# Patient Record
Sex: MALE | Race: WHITE | NOT HISPANIC OR LATINO | Employment: UNEMPLOYED | URBAN - METROPOLITAN AREA
[De-identification: names, ages, dates, MRNs, and addresses within clinical notes are randomized per-mention and may not be internally consistent; named-entity substitution may affect disease eponyms.]

---

## 2023-05-09 PROBLEM — F10.10 ETOH ABUSE: Status: ACTIVE | Noted: 2023-05-09

## 2023-05-09 PROBLEM — S82.871C OPEN DISPLACED PILON FRACTURE OF RIGHT TIBIA, TYPE IIIA, IIIB, OR IIIC: Status: ACTIVE | Noted: 2023-05-09

## 2023-05-09 PROBLEM — M15.9 PRIMARY OSTEOARTHRITIS INVOLVING MULTIPLE JOINTS: Chronic | Status: ACTIVE | Noted: 2023-05-09

## 2023-05-09 PROBLEM — S27.322A BILATERAL PULMONARY CONTUSION: Status: ACTIVE | Noted: 2023-05-09

## 2023-05-09 PROBLEM — F12.10 MILD TETRAHYDROCANNABINOL (THC) ABUSE: Status: ACTIVE | Noted: 2023-05-09

## 2023-05-09 PROBLEM — S50.811A ABRASION OF RIGHT FOREARM: Status: ACTIVE | Noted: 2023-05-09

## 2023-05-09 PROBLEM — K43.9 VENTRAL HERNIA WITHOUT OBSTRUCTION OR GANGRENE: Status: ACTIVE | Noted: 2023-05-09

## 2023-05-09 PROBLEM — S06.6X9A SUBARACHNOID HEMORRHAGE FOLLOWING INJURY, WITH LOSS OF CONSCIOUSNESS: Status: ACTIVE | Noted: 2023-05-09
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
## 2023-05-09 PROBLEM — J93.9 PNEUMOTHORAX ON LEFT: Status: ACTIVE | Noted: 2023-05-09

## 2023-05-09 PROBLEM — F10.921 ALCOHOL INTOXICATION WITH DELIRIUM: Status: ACTIVE | Noted: 2023-05-09

## 2023-05-09 PROBLEM — Z99.11 VENTILATOR DEPENDENCE: Status: ACTIVE | Noted: 2023-05-09

## 2023-05-09 PROBLEM — R57.9 SHOCK CIRCULATORY: Status: ACTIVE | Noted: 2023-05-09

## 2023-05-09 PROBLEM — S22.41XA CLOSED FRACTURE OF MULTIPLE RIBS OF RIGHT SIDE: Status: ACTIVE | Noted: 2023-05-09

## 2023-05-09 PROBLEM — F15.90 AMPHETAMINE USE: Status: ACTIVE | Noted: 2023-05-09

## 2023-05-09 PROBLEM — F12.10 MILD TETRAHYDROCANNABINOL (THC) ABUSE: Chronic | Status: ACTIVE | Noted: 2023-05-09

## 2023-05-09 PROBLEM — M47.815 SPONDYLOSIS OF THORACOLUMBAR REGION WITHOUT MYELOPATHY OR RADICULOPATHY: Status: ACTIVE | Noted: 2023-05-09

## 2023-05-09 PROBLEM — K86.89 PANCREATIC ATROPHY: Chronic | Status: ACTIVE | Noted: 2023-05-09

## 2023-05-09 PROBLEM — S51.811A LACERATION OF RIGHT FOREARM: Status: ACTIVE | Noted: 2023-05-09

## 2023-05-09 PROBLEM — M25.051 HEMARTHROSIS, RIGHT HIP: Status: ACTIVE | Noted: 2023-05-09

## 2023-05-09 PROBLEM — S41.112A LACERATION OF LEFT UPPER EXTREMITY: Status: ACTIVE | Noted: 2023-05-09

## 2023-05-09 PROBLEM — S00.81XA ABRASION OF FOREHEAD: Status: ACTIVE | Noted: 2023-05-09

## 2023-05-09 PROBLEM — S30.811A ABRASION OF FLANK: Status: ACTIVE | Noted: 2023-05-09

## 2023-05-09 PROBLEM — N28.1 RENAL CYST, RIGHT: Chronic | Status: ACTIVE | Noted: 2023-05-09

## 2023-05-09 PROBLEM — S91.011A: Status: ACTIVE | Noted: 2023-05-09

## 2023-05-09 PROBLEM — R57.9 SHOCK CIRCULATORY: Status: RESOLVED | Noted: 2023-05-09 | Resolved: 2023-05-09

## 2023-05-09 PROBLEM — S27.899A TRAUMATIC MEDIASTINAL HEMATOMA: Status: ACTIVE | Noted: 2023-05-09

## 2023-05-09 PROBLEM — S20.312A ABRASION OF LEFT CHEST WALL: Status: ACTIVE | Noted: 2023-05-09

## 2023-05-09 PROBLEM — K83.8 COMMON BILE DUCT DILATATION: Status: ACTIVE | Noted: 2023-05-09

## 2023-05-09 PROBLEM — F10.10 ETOH ABUSE: Chronic | Status: ACTIVE | Noted: 2023-05-09

## 2023-05-09 PROBLEM — E27.9 ADRENAL NODULE: Status: ACTIVE | Noted: 2023-05-09

## 2023-05-09 PROBLEM — E27.8 ADRENAL NODULE: Chronic | Status: ACTIVE | Noted: 2023-05-09

## 2023-05-09 PROBLEM — S20.311A ABRASION OF RIGHT CHEST WALL: Status: ACTIVE | Noted: 2023-05-09

## 2023-05-09 PROBLEM — J43.1 PANLOBULAR EMPHYSEMA: Status: ACTIVE | Noted: 2023-05-09

## 2023-05-09 PROBLEM — R18.8 INTRA-ABDOMINAL FLUID: Status: ACTIVE | Noted: 2023-05-09

## 2023-05-09 PROBLEM — J43.1 PANLOBULAR EMPHYSEMA: Chronic | Status: ACTIVE | Noted: 2023-05-09

## 2023-05-09 PROBLEM — R79.89 ELEVATED TROPONIN: Status: ACTIVE | Noted: 2023-05-09

## 2023-05-09 PROBLEM — N40.0 BPH (BENIGN PROSTATIC HYPERPLASIA): Status: ACTIVE | Noted: 2023-05-09

## 2023-05-09 PROBLEM — I70.0 ATHEROSCLEROSIS OF AORTA: Chronic | Status: ACTIVE | Noted: 2023-05-09

## 2023-05-09 PROBLEM — S22.21XA CLOSED FRACTURE OF MANUBRIUM: Status: ACTIVE | Noted: 2023-05-09

## 2023-05-09 PROBLEM — F10.921 ALCOHOL INTOXICATION WITH DELIRIUM: Status: RESOLVED | Noted: 2023-05-09 | Resolved: 2023-05-09

## 2023-05-09 PROBLEM — S51.011A LACERATION OF RIGHT ELBOW: Status: ACTIVE | Noted: 2023-05-09

## 2023-05-09 PROBLEM — S22.42XA CLOSED FRACTURE OF MULTIPLE RIBS OF LEFT SIDE: Status: ACTIVE | Noted: 2023-05-09

## 2023-05-09 PROBLEM — S26.91XA CARDIAC CONTUSION: Status: ACTIVE | Noted: 2023-05-09

## 2023-05-09 PROBLEM — R73.9 HYPERGLYCEMIA: Status: ACTIVE | Noted: 2023-05-09

## 2023-05-09 PROBLEM — D62 ACUTE BLOOD LOSS ANEMIA: Status: ACTIVE | Noted: 2023-05-09

## 2023-05-09 PROBLEM — R04.0 EPISTAXIS DUE TO TRAUMA: Status: ACTIVE | Noted: 2023-05-09

## 2023-05-09 PROBLEM — S80.11XA: Status: ACTIVE | Noted: 2023-05-09

## 2023-05-09 PROBLEM — R74.01 TRANSAMINITIS: Status: ACTIVE | Noted: 2023-05-09

## 2023-05-09 PROBLEM — S61.412A LACERATION OF LEFT HAND: Status: ACTIVE | Noted: 2023-05-09

## 2023-05-09 PROBLEM — S90.511A ABRASION OF RIGHT ANKLE: Status: ACTIVE | Noted: 2023-05-09

## 2023-05-09 PROBLEM — E87.1 HYPONATREMIA: Status: ACTIVE | Noted: 2023-05-09

## 2023-05-09 PROBLEM — M15.0 PRIMARY OSTEOARTHRITIS INVOLVING MULTIPLE JOINTS: Status: ACTIVE | Noted: 2023-05-09

## 2023-05-09 PROBLEM — S73.004A HIP DISLOCATION, RIGHT, INITIAL ENCOUNTER: Status: ACTIVE | Noted: 2023-05-09

## 2023-05-09 PROBLEM — D69.6 THROMBOCYTOPENIA: Status: ACTIVE | Noted: 2023-05-09

## 2023-05-09 PROBLEM — K83.8 INTRAHEPATIC BILE DUCT DILATION: Status: ACTIVE | Noted: 2023-05-09

## 2023-05-09 PROBLEM — S82.891B TYPE I OR II OPEN FRACTURE OF RIGHT ANKLE: Status: ACTIVE | Noted: 2023-05-09

## 2023-05-09 PROBLEM — M47.812 SPONDYLOSIS OF CERVICAL REGION WITHOUT MYELOPATHY OR RADICULOPATHY: Chronic | Status: ACTIVE | Noted: 2023-05-09

## 2023-05-09 PROBLEM — M47.815 SPONDYLOSIS OF THORACOLUMBAR REGION WITHOUT MYELOPATHY OR RADICULOPATHY: Chronic | Status: ACTIVE | Noted: 2023-05-09

## 2023-05-09 PROBLEM — N28.1 RENAL CYST, RIGHT: Status: ACTIVE | Noted: 2023-05-09

## 2023-05-09 PROBLEM — S06.339A: Status: ACTIVE | Noted: 2023-05-09

## 2023-05-09 PROBLEM — D72.829 LEUKOCYTOSIS: Status: ACTIVE | Noted: 2023-05-09

## 2023-05-09 PROBLEM — E27.8 ADRENAL NODULE: Status: ACTIVE | Noted: 2023-05-09

## 2023-05-09 PROBLEM — E87.20 LACTIC ACIDOSIS: Status: ACTIVE | Noted: 2023-05-09

## 2023-05-09 PROBLEM — R31.29 MICROSCOPIC HEMATURIA: Status: ACTIVE | Noted: 2023-05-09

## 2023-05-09 PROBLEM — N40.0 BPH (BENIGN PROSTATIC HYPERPLASIA): Chronic | Status: ACTIVE | Noted: 2023-05-09

## 2023-05-09 PROBLEM — I31.39 PERICARDIAL EFFUSION: Status: ACTIVE | Noted: 2023-05-09

## 2023-05-09 PROBLEM — I70.0 ATHEROSCLEROSIS OF AORTA: Status: ACTIVE | Noted: 2023-05-09

## 2023-05-09 PROBLEM — D72.829 LEUKOCYTOSIS: Status: RESOLVED | Noted: 2023-05-09 | Resolved: 2023-05-09

## 2023-05-09 PROBLEM — S80.211A ABRASION OF RIGHT KNEE: Status: ACTIVE | Noted: 2023-05-09

## 2023-05-09 PROBLEM — S01.511A LACERATION OF LOWER LIP: Status: ACTIVE | Noted: 2023-05-09

## 2023-05-09 PROBLEM — J94.2 HEMOTHORAX ON LEFT: Status: ACTIVE | Noted: 2023-05-09

## 2023-05-09 PROBLEM — M15.9 PRIMARY OSTEOARTHRITIS INVOLVING MULTIPLE JOINTS: Status: ACTIVE | Noted: 2023-05-09

## 2023-05-09 PROBLEM — S32.009A CLOSED FRACTURE OF TRANSVERSE PROCESS OF LUMBAR VERTEBRA: Status: ACTIVE | Noted: 2023-05-09

## 2023-05-09 PROBLEM — K86.89 PANCREATIC ATROPHY: Status: ACTIVE | Noted: 2023-05-09

## 2023-05-09 PROBLEM — S30.811A ABRASION OF ABDOMINAL WALL: Status: ACTIVE | Noted: 2023-05-09

## 2023-05-09 PROBLEM — S06.5X9A TRAUMATIC SUBDURAL HEMATOMA WITH LOSS OF CONSCIOUSNESS: Status: ACTIVE | Noted: 2023-05-09

## 2023-05-09 PROBLEM — K43.9 VENTRAL HERNIA WITHOUT OBSTRUCTION OR GANGRENE: Chronic | Status: ACTIVE | Noted: 2023-05-09

## 2023-05-09 PROBLEM — F15.90 AMPHETAMINE USE: Chronic | Status: ACTIVE | Noted: 2023-05-09

## 2023-05-09 PROBLEM — E27.9 ADRENAL NODULE: Chronic | Status: ACTIVE | Noted: 2023-05-09

## 2023-05-09 PROBLEM — S82.871B: Status: ACTIVE | Noted: 2023-05-09

## 2023-05-09 PROBLEM — M47.812 SPONDYLOSIS OF CERVICAL REGION WITHOUT MYELOPATHY OR RADICULOPATHY: Status: ACTIVE | Noted: 2023-05-09

## 2023-05-09 PROBLEM — S70.211A ABRASION OF RIGHT HIP: Status: ACTIVE | Noted: 2023-05-09

## 2023-05-09 PROBLEM — S01.21XA: Status: ACTIVE | Noted: 2023-05-09

## 2023-05-09 PROBLEM — M15.0 PRIMARY OSTEOARTHRITIS INVOLVING MULTIPLE JOINTS: Chronic | Status: ACTIVE | Noted: 2023-05-09

## 2023-05-10 PROBLEM — S22.43XA CLOSED FRACTURE OF MULTIPLE RIBS OF BOTH SIDES: Status: ACTIVE | Noted: 2023-05-09

## 2023-05-10 PROBLEM — E83.39 HYPOPHOSPHATEMIA: Status: ACTIVE | Noted: 2023-05-10

## 2023-05-10 PROBLEM — R73.9 HYPERGLYCEMIA: Status: RESOLVED | Noted: 2023-05-09 | Resolved: 2023-05-10

## 2023-05-10 PROBLEM — E88.09 HYPOALBUMINEMIA: Status: ACTIVE | Noted: 2023-05-10

## 2023-05-12 PROBLEM — E87.1 HYPONATREMIA: Status: RESOLVED | Noted: 2023-05-09 | Resolved: 2023-05-12

## 2023-05-12 PROBLEM — R31.29 MICROSCOPIC HEMATURIA: Status: RESOLVED | Noted: 2023-05-09 | Resolved: 2023-05-12

## 2023-05-12 PROBLEM — E87.20 LACTIC ACIDOSIS: Status: RESOLVED | Noted: 2023-05-09 | Resolved: 2023-05-12

## 2023-05-12 PROBLEM — E83.39 HYPOPHOSPHATEMIA: Status: RESOLVED | Noted: 2023-05-10 | Resolved: 2023-05-12

## 2023-05-13 PROBLEM — K83.8 COMMON BILE DUCT DILATATION: Chronic | Status: ACTIVE | Noted: 2023-05-09

## 2023-05-14 PROBLEM — E87.1 HYPONATREMIA: Status: RESOLVED | Noted: 2023-05-09 | Resolved: 2023-05-14

## 2023-05-14 PROBLEM — D69.6 THROMBOCYTOPENIA: Status: RESOLVED | Noted: 2023-05-09 | Resolved: 2023-05-14

## 2023-05-14 PROBLEM — R74.01 TRANSAMINITIS: Status: RESOLVED | Noted: 2023-05-09 | Resolved: 2023-05-14

## 2023-05-16 PROBLEM — S72.061A: Status: ACTIVE | Noted: 2023-05-16

## 2023-05-17 PROBLEM — J94.2 HEMOTHORAX ON LEFT: Status: RESOLVED | Noted: 2023-05-09 | Resolved: 2023-05-17

## 2023-05-18 PROBLEM — J96.90 RESPIRATORY FAILURE FOLLOWING TRAUMA: Status: ACTIVE | Noted: 2023-05-09

## 2023-05-23 PROBLEM — I60.9 SAH (SUBARACHNOID HEMORRHAGE): Status: ACTIVE | Noted: 2023-05-23

## 2023-05-23 PROBLEM — Z98.890 S/P TRACHEOPLASTY: Status: ACTIVE | Noted: 2023-05-23

## 2023-05-23 PROBLEM — S06.9X9A TRAUMATIC BRAIN INJURY WITH LOSS OF CONSCIOUSNESS: Status: ACTIVE | Noted: 2023-05-09

## 2023-05-23 PROBLEM — S22.23XD: Status: ACTIVE | Noted: 2023-05-09

## 2023-05-23 PROBLEM — S06.2X9A: Status: ACTIVE | Noted: 2023-05-23

## 2023-05-23 PROBLEM — S29.9XXA CHEST WALL TRAUMA: Status: ACTIVE | Noted: 2023-05-23

## 2023-05-23 PROBLEM — S72.064D: Status: ACTIVE | Noted: 2023-05-16

## 2023-05-23 PROBLEM — Z89.511 S/P BKA (BELOW KNEE AMPUTATION), RIGHT: Status: ACTIVE | Noted: 2023-05-23

## 2023-05-23 PROBLEM — S06.5XAA SDH (SUBDURAL HEMATOMA): Status: ACTIVE | Noted: 2023-05-09

## 2023-05-26 PROBLEM — S27.322A BILATERAL PULMONARY CONTUSION: Status: RESOLVED | Noted: 2023-05-09 | Resolved: 2023-05-26

## 2023-05-26 PROBLEM — R79.89 ELEVATED TROPONIN: Status: RESOLVED | Noted: 2023-05-09 | Resolved: 2023-05-26

## 2023-05-30 PROBLEM — F05 DELIRIUM DUE TO ANOTHER MEDICAL CONDITION: Status: ACTIVE | Noted: 2023-05-30

## 2023-05-31 PROBLEM — S70.211A ABRASION OF RIGHT HIP: Status: RESOLVED | Noted: 2023-05-09 | Resolved: 2023-05-31

## 2023-05-31 PROBLEM — S20.311A ABRASION OF RIGHT CHEST WALL: Status: RESOLVED | Noted: 2023-05-09 | Resolved: 2023-05-31

## 2023-05-31 PROBLEM — S91.011A: Status: RESOLVED | Noted: 2023-05-09 | Resolved: 2023-05-31

## 2023-05-31 PROBLEM — S30.811A ABRASION OF FLANK: Status: RESOLVED | Noted: 2023-05-09 | Resolved: 2023-05-31

## 2023-05-31 PROBLEM — S73.004A HIP DISLOCATION, RIGHT, INITIAL ENCOUNTER: Status: RESOLVED | Noted: 2023-05-09 | Resolved: 2023-05-31

## 2023-05-31 PROBLEM — S80.211A ABRASION OF RIGHT KNEE: Status: RESOLVED | Noted: 2023-05-09 | Resolved: 2023-05-31

## 2023-05-31 PROBLEM — S82.891B TYPE I OR II OPEN FRACTURE OF RIGHT ANKLE: Status: RESOLVED | Noted: 2023-05-09 | Resolved: 2023-05-31

## 2023-05-31 PROBLEM — S20.312A ABRASION OF LEFT CHEST WALL: Status: RESOLVED | Noted: 2023-05-09 | Resolved: 2023-05-31

## 2023-05-31 PROBLEM — S30.811A ABRASION OF ABDOMINAL WALL: Status: RESOLVED | Noted: 2023-05-09 | Resolved: 2023-05-31

## 2023-05-31 PROBLEM — S90.511A ABRASION OF RIGHT ANKLE: Status: RESOLVED | Noted: 2023-05-09 | Resolved: 2023-05-31

## 2023-05-31 PROBLEM — J93.9 PNEUMOTHORAX ON LEFT: Status: RESOLVED | Noted: 2023-05-09 | Resolved: 2023-05-31

## 2023-05-31 PROBLEM — S80.11XA: Status: RESOLVED | Noted: 2023-05-09 | Resolved: 2023-05-31

## 2023-05-31 PROBLEM — S82.871B: Status: RESOLVED | Noted: 2023-05-09 | Resolved: 2023-05-31

## 2023-05-31 PROBLEM — S00.81XA ABRASION OF FOREHEAD: Status: RESOLVED | Noted: 2023-05-09 | Resolved: 2023-05-31

## 2023-05-31 PROBLEM — S82.871C OPEN DISPLACED PILON FRACTURE OF RIGHT TIBIA, TYPE IIIA, IIIB, OR IIIC: Status: RESOLVED | Noted: 2023-05-09 | Resolved: 2023-05-31

## 2023-05-31 PROBLEM — S50.811A ABRASION OF RIGHT FOREARM: Status: RESOLVED | Noted: 2023-05-09 | Resolved: 2023-05-31

## 2023-06-01 PROBLEM — D72.829 LEUKOCYTOSIS: Status: RESOLVED | Noted: 2023-05-09 | Resolved: 2023-06-01

## 2023-06-02 PROBLEM — S51.811A LACERATION OF RIGHT FOREARM: Status: RESOLVED | Noted: 2023-05-09 | Resolved: 2023-06-02

## 2023-06-02 PROBLEM — J43.1 PANLOBULAR EMPHYSEMA: Chronic | Status: RESOLVED | Noted: 2023-05-09 | Resolved: 2023-06-02

## 2023-06-02 PROBLEM — E88.09 HYPOALBUMINEMIA: Status: RESOLVED | Noted: 2023-05-10 | Resolved: 2023-06-02

## 2023-06-02 PROBLEM — R18.8 INTRA-ABDOMINAL FLUID: Status: RESOLVED | Noted: 2023-05-09 | Resolved: 2023-06-02

## 2023-06-02 PROBLEM — S22.23XD: Status: RESOLVED | Noted: 2023-05-09 | Resolved: 2023-06-02

## 2023-06-02 PROBLEM — M25.051 HEMARTHROSIS, RIGHT HIP: Status: RESOLVED | Noted: 2023-05-09 | Resolved: 2023-06-02

## 2023-06-02 PROBLEM — I31.39 PERICARDIAL EFFUSION: Status: RESOLVED | Noted: 2023-05-09 | Resolved: 2023-06-02

## 2023-06-02 PROBLEM — S41.112A LACERATION OF LEFT UPPER EXTREMITY: Status: RESOLVED | Noted: 2023-05-09 | Resolved: 2023-06-02

## 2023-06-02 PROBLEM — S01.511A LACERATION OF LOWER LIP: Status: RESOLVED | Noted: 2023-05-09 | Resolved: 2023-06-02

## 2023-06-02 PROBLEM — S61.412A LACERATION OF LEFT HAND: Status: RESOLVED | Noted: 2023-05-09 | Resolved: 2023-06-02

## 2023-06-02 PROBLEM — R04.0 EPISTAXIS DUE TO TRAUMA: Status: RESOLVED | Noted: 2023-05-09 | Resolved: 2023-06-02

## 2023-06-02 PROBLEM — D62 ACUTE BLOOD LOSS ANEMIA: Status: RESOLVED | Noted: 2023-05-09 | Resolved: 2023-06-02

## 2023-06-02 PROBLEM — S01.21XA: Status: RESOLVED | Noted: 2023-05-09 | Resolved: 2023-06-02

## 2023-06-03 PROBLEM — M70.22 OLECRANON BURSITIS OF LEFT ELBOW: Status: ACTIVE | Noted: 2023-06-03

## 2023-06-05 PROBLEM — E27.8 ADRENAL NODULE: Chronic | Status: RESOLVED | Noted: 2023-05-09 | Resolved: 2023-06-05

## 2023-06-05 PROBLEM — E27.9 ADRENAL NODULE: Chronic | Status: RESOLVED | Noted: 2023-05-09 | Resolved: 2023-06-05

## 2023-06-05 PROBLEM — F05 DELIRIUM DUE TO ANOTHER MEDICAL CONDITION: Status: RESOLVED | Noted: 2023-05-30 | Resolved: 2023-06-05

## 2023-06-06 PROBLEM — L21.9 SEBORRHEIC DERMATITIS: Status: ACTIVE | Noted: 2023-06-06

## 2023-06-08 PROBLEM — S06.32AA INTRAPARENCHYMAL HEMATOMA OF LEFT SIDE OF BRAIN DUE TO TRAUMA: Status: ACTIVE | Noted: 2023-06-08

## 2023-06-08 PROBLEM — S22.21XA CLOSED FRACTURE OF MANUBRIUM: Status: ACTIVE | Noted: 2023-06-08

## 2023-06-08 PROBLEM — Z91.89 AT RISK FOR LONG QT SYNDROME: Status: ACTIVE | Noted: 2023-06-08

## 2023-06-08 PROBLEM — S22.21XA FRACTURE OF MANUBRIUM, INITIAL ENCOUNTER FOR CLOSED FRACTURE: Status: ACTIVE | Noted: 2023-06-08

## 2023-06-08 PROBLEM — I49.9 CARDIAC ARRHYTHMIA: Status: ACTIVE | Noted: 2023-06-08

## 2023-06-08 PROBLEM — S72.061A: Status: ACTIVE | Noted: 2023-06-08

## 2023-06-08 PROBLEM — S61.412A LACERATION OF LEFT HAND WITHOUT FOREIGN BODY: Status: ACTIVE | Noted: 2023-06-08

## 2023-06-08 PROBLEM — S27.322A CONTUSION OF BOTH LUNGS: Status: ACTIVE | Noted: 2023-06-08

## 2023-06-08 PROBLEM — R18.8 OTHER ASCITES: Status: ACTIVE | Noted: 2023-06-08

## 2023-09-04 PROBLEM — J96.90 RESPIRATORY FAILURE FOLLOWING TRAUMA: Status: RESOLVED | Noted: 2023-05-09 | Resolved: 2023-09-04

## 2023-10-08 PROBLEM — S02.600D: Status: ACTIVE | Noted: 2023-10-08

## 2023-11-22 PROBLEM — Z80.0 FAMILY HISTORY OF COLON CANCER: Status: ACTIVE | Noted: 2023-11-22

## 2023-11-22 PROBLEM — Z12.11 ENCOUNTER FOR SCREENING COLONOSCOPY: Status: ACTIVE | Noted: 2023-11-22

## 2024-03-13 PROBLEM — G40.909 SEIZURE DISORDER: Status: ACTIVE | Noted: 2024-03-13

## 2024-04-09 ENCOUNTER — PATIENT OUTREACH (OUTPATIENT)
Dept: ADMINISTRATIVE | Facility: HOSPITAL | Age: 44
End: 2024-04-09

## 2024-07-26 ENCOUNTER — TELEPHONE (OUTPATIENT)
Dept: ADMINISTRATIVE | Facility: HOSPITAL | Age: 44
End: 2024-07-26

## 2024-07-26 ENCOUNTER — PATIENT OUTREACH (OUTPATIENT)
Dept: ADMINISTRATIVE | Facility: HOSPITAL | Age: 44
End: 2024-07-26

## 2025-01-21 ENCOUNTER — OUTPATIENT CASE MANAGEMENT (OUTPATIENT)
Dept: ADMINISTRATIVE | Facility: OTHER | Age: 45
End: 2025-01-21

## 2025-01-21 NOTE — PROGRESS NOTES
01-17-25 Referral received from Dr.Cole Vargas: requesting assistance be provided to patient with securing prescription approved.  Unsuccessful call placed to patient at [phone] ( message, patient not accepting calls at this time/ try call at a later time).  Damaris Dow-[facility]  [phone]